# Patient Record
Sex: MALE | Race: OTHER | NOT HISPANIC OR LATINO | URBAN - METROPOLITAN AREA
[De-identification: names, ages, dates, MRNs, and addresses within clinical notes are randomized per-mention and may not be internally consistent; named-entity substitution may affect disease eponyms.]

---

## 2018-07-28 ENCOUNTER — EMERGENCY (EMERGENCY)
Facility: HOSPITAL | Age: 28
LOS: 1 days | End: 2018-07-28
Attending: EMERGENCY MEDICINE
Payer: COMMERCIAL

## 2018-07-28 VITALS — SYSTOLIC BLOOD PRESSURE: 116 MMHG | RESPIRATION RATE: 16 BRPM | DIASTOLIC BLOOD PRESSURE: 75 MMHG | HEART RATE: 79 BPM

## 2018-07-28 VITALS
TEMPERATURE: 98 F | HEART RATE: 78 BPM | SYSTOLIC BLOOD PRESSURE: 125 MMHG | OXYGEN SATURATION: 99 % | RESPIRATION RATE: 18 BRPM | DIASTOLIC BLOOD PRESSURE: 73 MMHG

## 2018-07-28 PROCEDURE — 73620 X-RAY EXAM OF FOOT: CPT

## 2018-07-28 PROCEDURE — 90715 TDAP VACCINE 7 YRS/> IM: CPT

## 2018-07-28 PROCEDURE — 73620 X-RAY EXAM OF FOOT: CPT | Mod: 26,LT

## 2018-07-28 PROCEDURE — 99283 EMERGENCY DEPT VISIT LOW MDM: CPT

## 2018-07-28 PROCEDURE — 99284 EMERGENCY DEPT VISIT MOD MDM: CPT | Mod: 25

## 2018-07-28 PROCEDURE — 90471 IMMUNIZATION ADMIN: CPT

## 2018-07-28 PROCEDURE — 73590 X-RAY EXAM OF LOWER LEG: CPT | Mod: 26,LT

## 2018-07-28 PROCEDURE — 73590 X-RAY EXAM OF LOWER LEG: CPT

## 2018-07-28 RX ORDER — TETANUS AND DIPHTHERIA TOXOIDS ADSORBED 2; 2 [LF]/.5ML; [LF]/.5ML
0.5 INJECTION INTRAMUSCULAR ONCE
Qty: 0 | Refills: 0 | Status: DISCONTINUED | OUTPATIENT
Start: 2018-07-28 | End: 2018-07-28

## 2018-07-28 RX ORDER — IBUPROFEN 200 MG
600 TABLET ORAL ONCE
Qty: 0 | Refills: 0 | Status: COMPLETED | OUTPATIENT
Start: 2018-07-28 | End: 2018-07-28

## 2018-07-28 RX ORDER — TETANUS TOXOID, REDUCED DIPHTHERIA TOXOID AND ACELLULAR PERTUSSIS VACCINE, ADSORBED 5; 2.5; 8; 8; 2.5 [IU]/.5ML; [IU]/.5ML; UG/.5ML; UG/.5ML; UG/.5ML
0.5 SUSPENSION INTRAMUSCULAR ONCE
Qty: 0 | Refills: 0 | Status: COMPLETED | OUTPATIENT
Start: 2018-07-28 | End: 2018-07-28

## 2018-07-28 RX ORDER — CEPHALEXIN 500 MG
1 CAPSULE ORAL
Qty: 14 | Refills: 0 | OUTPATIENT
Start: 2018-07-28 | End: 2018-08-03

## 2018-07-28 RX ADMIN — TETANUS TOXOID, REDUCED DIPHTHERIA TOXOID AND ACELLULAR PERTUSSIS VACCINE, ADSORBED 0.5 MILLILITER(S): 5; 2.5; 8; 8; 2.5 SUSPENSION INTRAMUSCULAR at 17:43

## 2018-07-28 RX ADMIN — Medication 600 MILLIGRAM(S): at 17:02
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
## 2018-07-28 NOTE — ED PROVIDER NOTE - PROGRESS NOTE DETAILS
Ramon Slater PGY1    Xray report states possible foreign bodies in left foot, spoke to podiatry who agreed to see patient to evaluate foreign bodies. Ramon Slater PGY1    Podiatry is bedside evaluating the patient. Attending MD Arteaga: Seen by podiatry, L dorsal wound packed, irrigated, no embedded fb appreciated.  Patient to keep packing in and follow up with Dr. Mireya Mullins early next week.  Rx Keflex.  Dr. Mullins [phone].  Follow up instructions given, importance of follow up emphasized, return to ED parameters reviewed and patient verbalized understanding.  All questions answered, all concerns addressed.
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
## 2018-07-28 NOTE — ED PROVIDER NOTE - SKIN WOUND DESCRIPTION
DIRTY/DEVITALIZED TISSUE PRESENT/multiple superficial abrasions to left shin, left foot, left hand, no foreign bodies present in wound. 1cm deep wound on left foot and on left shin with no active bleeding
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
## 2018-09-04 NOTE — ED PROVIDER NOTE - CPE EDP RESP NORM
----- Message from Shanna Zavala sent at 9/4/2018 10:00 AM EDT -----  Regarding: see note below  Patient cancelled labs but still on for  Doctor   9/6  
normal...

## 2019-04-09 NOTE — ED PROVIDER NOTE - NS ED MD SHIFT CHANGE PENDING ITEMS
Called patient and discussed US imaging results:    IMPRESSION:  High-frequency linear probe was utilized for evaluation of the  left forearm in region of mass. There is a 1.9 x 0.6 x 0.8 cm solid mass  with internal arterial and venous flow. This is indeterminate. This mass  could be benign or malignant and vascular malformation is not entirely  excluded. Contrast-enhanced MRI can be considered for further evaluation.  Tissue sampling may be necessary.    Recommended further evaluation with MRI, order placed.  Patient in agreement with plan.    Dominique Haas MD        x-ray results

## 2023-12-26 NOTE — ED PROVIDER NOTE - CARE PLAN
Informed patient of Preventative Visit scheduled for 1/11/24. Patient will call new insurance (BC/BS) to find out about physical protocols.  Dolores GONZALEZ    [facility]     Principal Discharge DX:	Abrasions of multiple sites